# Patient Record
Sex: MALE | Race: WHITE | Employment: OTHER | URBAN - METROPOLITAN AREA
[De-identification: names, ages, dates, MRNs, and addresses within clinical notes are randomized per-mention and may not be internally consistent; named-entity substitution may affect disease eponyms.]

---

## 2017-01-01 ENCOUNTER — TELEPHONE (OUTPATIENT)
Dept: INTERNAL MEDICINE CLINIC | Age: 82
End: 2017-01-01

## 2017-01-01 ENCOUNTER — DOCUMENTATION ONLY (OUTPATIENT)
Dept: INTERNAL MEDICINE CLINIC | Age: 82
End: 2017-01-01

## 2017-01-01 ENCOUNTER — OFFICE VISIT (OUTPATIENT)
Dept: INTERNAL MEDICINE CLINIC | Age: 82
End: 2017-01-01

## 2017-01-01 VITALS
OXYGEN SATURATION: 96 % | HEIGHT: 73 IN | WEIGHT: 173 LBS | BODY MASS INDEX: 22.93 KG/M2 | TEMPERATURE: 98.4 F | HEART RATE: 71 BPM | SYSTOLIC BLOOD PRESSURE: 143 MMHG | DIASTOLIC BLOOD PRESSURE: 71 MMHG | RESPIRATION RATE: 18 BRPM

## 2017-01-01 VITALS
SYSTOLIC BLOOD PRESSURE: 125 MMHG | DIASTOLIC BLOOD PRESSURE: 71 MMHG | TEMPERATURE: 97.5 F | OXYGEN SATURATION: 98 % | HEIGHT: 73 IN | HEART RATE: 57 BPM | BODY MASS INDEX: 23.86 KG/M2 | RESPIRATION RATE: 16 BRPM | WEIGHT: 180 LBS

## 2017-01-01 DIAGNOSIS — M70.21 OLECRANON BURSITIS OF RIGHT ELBOW: ICD-10-CM

## 2017-01-01 DIAGNOSIS — R29.6 RECURRENT FALLS: ICD-10-CM

## 2017-01-01 DIAGNOSIS — E78.2 MIXED HYPERLIPIDEMIA: ICD-10-CM

## 2017-01-01 DIAGNOSIS — E11.9 TYPE 2 DIABETES MELLITUS WITHOUT COMPLICATION, WITH LONG-TERM CURRENT USE OF INSULIN (HCC): ICD-10-CM

## 2017-01-01 DIAGNOSIS — Z79.4 TYPE 2 DIABETES MELLITUS WITHOUT COMPLICATION, WITH LONG-TERM CURRENT USE OF INSULIN (HCC): ICD-10-CM

## 2017-01-01 DIAGNOSIS — I10 ESSENTIAL HYPERTENSION: ICD-10-CM

## 2017-01-01 DIAGNOSIS — R41.89 COGNITIVE DECLINE: ICD-10-CM

## 2017-01-01 DIAGNOSIS — R53.81 DECLINING FUNCTIONAL STATUS: Primary | ICD-10-CM

## 2017-01-01 DIAGNOSIS — Z00.00 MEDICARE ANNUAL WELLNESS VISIT, SUBSEQUENT: Primary | ICD-10-CM

## 2017-01-01 DIAGNOSIS — I25.10 CORONARY ARTERY DISEASE INVOLVING NATIVE CORONARY ARTERY OF NATIVE HEART WITHOUT ANGINA PECTORIS: ICD-10-CM

## 2017-01-01 DIAGNOSIS — Z71.89 ADVANCE CARE PLANNING: ICD-10-CM

## 2017-01-01 DIAGNOSIS — Z86.73 HISTORY OF STROKE: ICD-10-CM

## 2017-01-01 DIAGNOSIS — G20 PARKINSON DISEASE (HCC): ICD-10-CM

## 2017-01-01 DIAGNOSIS — J38.00: ICD-10-CM

## 2017-01-01 DIAGNOSIS — G30.0 EARLY ONSET ALZHEIMER'S DEMENTIA WITHOUT BEHAVIORAL DISTURBANCE (HCC): ICD-10-CM

## 2017-01-01 DIAGNOSIS — Z23 ENCOUNTER FOR IMMUNIZATION: ICD-10-CM

## 2017-01-01 DIAGNOSIS — F02.80 EARLY ONSET ALZHEIMER'S DEMENTIA WITHOUT BEHAVIORAL DISTURBANCE (HCC): ICD-10-CM

## 2017-01-01 DIAGNOSIS — J38.00 VOCAL CORD WEAKNESS: ICD-10-CM

## 2017-01-01 DIAGNOSIS — G20 PARKINSON DISEASE (HCC): Primary | ICD-10-CM

## 2017-01-01 LAB
ALBUMIN SERPL-MCNC: 4 G/DL (ref 3.5–4.7)
ALBUMIN/CREAT UR: 15.6 MG/G CREAT (ref 0–30)
ALBUMIN/GLOB SERPL: 1.6 {RATIO} (ref 1.1–2.5)
ALP SERPL-CCNC: 66 IU/L (ref 39–117)
ALT SERPL-CCNC: 9 IU/L (ref 0–44)
AST SERPL-CCNC: 22 IU/L (ref 0–40)
BASOPHILS # BLD AUTO: 0 X10E3/UL (ref 0–0.2)
BASOPHILS NFR BLD AUTO: 0 %
BILIRUB SERPL-MCNC: 0.3 MG/DL (ref 0–1.2)
BUN SERPL-MCNC: 25 MG/DL (ref 8–27)
BUN/CREAT SERPL: 21 (ref 10–22)
CALCIUM SERPL-MCNC: 9.2 MG/DL (ref 8.6–10.2)
CHLORIDE SERPL-SCNC: 104 MMOL/L (ref 96–106)
CHOLEST SERPL-MCNC: 131 MG/DL (ref 100–199)
CO2 SERPL-SCNC: 27 MMOL/L (ref 18–29)
CREAT SERPL-MCNC: 1.18 MG/DL (ref 0.76–1.27)
CREAT UR-MCNC: 64.7 MG/DL
EOSINOPHIL # BLD AUTO: 0.2 X10E3/UL (ref 0–0.4)
EOSINOPHIL NFR BLD AUTO: 2 %
ERYTHROCYTE [DISTWIDTH] IN BLOOD BY AUTOMATED COUNT: 12.9 % (ref 12.3–15.4)
EST. AVERAGE GLUCOSE BLD GHB EST-MCNC: 117 MG/DL
GLOBULIN SER CALC-MCNC: 2.5 G/DL (ref 1.5–4.5)
GLUCOSE SERPL-MCNC: 89 MG/DL (ref 65–99)
HBA1C MFR BLD: 5.7 % (ref 4.8–5.6)
HCT VFR BLD AUTO: 37.8 % (ref 37.5–51)
HDLC SERPL-MCNC: 46 MG/DL
HGB BLD-MCNC: 12.8 G/DL (ref 12.6–17.7)
IMM GRANULOCYTES # BLD: 0 X10E3/UL (ref 0–0.1)
IMM GRANULOCYTES NFR BLD: 0 %
INTERPRETATION, 910389: NORMAL
INTERPRETATION: NORMAL
LDLC SERPL CALC-MCNC: 65 MG/DL (ref 0–99)
LYMPHOCYTES # BLD AUTO: 1.1 X10E3/UL (ref 0.7–3.1)
LYMPHOCYTES NFR BLD AUTO: 11 %
Lab: NORMAL
MCH RBC QN AUTO: 31.8 PG (ref 26.6–33)
MCHC RBC AUTO-ENTMCNC: 33.9 G/DL (ref 31.5–35.7)
MCV RBC AUTO: 94 FL (ref 79–97)
MICROALBUMIN UR-MCNC: 10.1 UG/ML
MONOCYTES # BLD AUTO: 0.8 X10E3/UL (ref 0.1–0.9)
MONOCYTES NFR BLD AUTO: 8 %
NEUTROPHILS # BLD AUTO: 8.3 X10E3/UL (ref 1.4–7)
NEUTROPHILS NFR BLD AUTO: 79 %
PDF IMAGE, 910387: NORMAL
PLATELET # BLD AUTO: 269 X10E3/UL (ref 150–379)
POTASSIUM SERPL-SCNC: 5.4 MMOL/L (ref 3.5–5.2)
PROT SERPL-MCNC: 6.5 G/DL (ref 6–8.5)
RBC # BLD AUTO: 4.03 X10E6/UL (ref 4.14–5.8)
SODIUM SERPL-SCNC: 147 MMOL/L (ref 134–144)
T4 FREE SERPL-MCNC: 0.98 NG/DL (ref 0.82–1.77)
TRIGL SERPL-MCNC: 99 MG/DL (ref 0–149)
TSH SERPL DL<=0.005 MIU/L-ACNC: 1.11 UIU/ML (ref 0.45–4.5)
VLDLC SERPL CALC-MCNC: 20 MG/DL (ref 5–40)
WBC # BLD AUTO: 10.4 X10E3/UL (ref 3.4–10.8)

## 2017-01-01 RX ORDER — POLYETHYLENE GLYCOL 3350 17 G/17G
17 POWDER, FOR SOLUTION ORAL DAILY
COMMUNITY

## 2017-01-01 RX ORDER — LOSARTAN POTASSIUM 100 MG/1
100 TABLET ORAL DAILY
Qty: 30 TAB | Refills: 5 | Status: SHIPPED | OUTPATIENT
Start: 2017-01-01

## 2017-01-01 RX ORDER — DOCUSATE SODIUM 100 MG/1
CAPSULE, LIQUID FILLED ORAL
Qty: 60 CAP | Refills: 5 | Status: SHIPPED | OUTPATIENT
Start: 2017-01-01

## 2017-01-01 RX ORDER — CEPHALEXIN 250 MG/1
250 CAPSULE ORAL 3 TIMES DAILY
Qty: 21 CAP | Refills: 0 | Status: SHIPPED | OUTPATIENT
Start: 2017-01-01 | End: 2017-01-01

## 2017-01-01 RX ORDER — METHYLPREDNISOLONE 4 MG/1
TABLET ORAL
Qty: 1 DOSE PACK | Refills: 0 | Status: SHIPPED | OUTPATIENT
Start: 2017-01-01 | End: 2017-01-01 | Stop reason: ALTCHOICE

## 2017-01-01 RX ORDER — ASPIRIN 325 MG
325 TABLET ORAL DAILY
Qty: 90 TAB | Refills: 3 | Status: SHIPPED | OUTPATIENT
Start: 2017-01-01

## 2017-01-01 RX ORDER — FUROSEMIDE 20 MG/1
20 TABLET ORAL DAILY
Qty: 30 TAB | Refills: 5 | Status: SHIPPED | OUTPATIENT
Start: 2017-01-01

## 2017-01-01 RX ORDER — METOPROLOL SUCCINATE 25 MG/1
25 TABLET, EXTENDED RELEASE ORAL DAILY
Qty: 30 TAB | Refills: 5 | Status: SHIPPED | OUTPATIENT
Start: 2017-01-01

## 2017-01-01 RX ORDER — CARBIDOPA AND LEVODOPA 25; 100 MG/1; MG/1
2 TABLET ORAL 3 TIMES DAILY
Qty: 180 TAB | Refills: 5 | Status: SHIPPED | OUTPATIENT
Start: 2017-01-01

## 2017-01-01 RX ORDER — PRAVASTATIN SODIUM 40 MG/1
40 TABLET ORAL DAILY
Qty: 30 TAB | Refills: 5 | Status: SHIPPED | OUTPATIENT
Start: 2017-01-01

## 2017-01-19 NOTE — TELEPHONE ENCOUNTER
Pt's niece Alejandro Duncan called to speak with nurse to get a script for Hospice for Mr. Heidi Conner. Spoke with Jose Walters NN she will also speak with dr Darryle Colt to get the Script for pt.

## 2017-01-19 NOTE — TELEPHONE ENCOUNTER
Jessica Sims advised that patient must be seen by physician to determine if referral for hospice consult is appropriate.

## 2017-02-10 NOTE — PROGRESS NOTES
Reviewed record  In preparation for visit and have obtained necessary documentation. 1. Have you been to the ER, urgent care clinic since your last visit? Hospitalized since your last visit?no  2. Have you seen or consulted any other health care providers outside of the [address] [place] [address] since your last visit? Include any pap smears or colon screening. Sees Dr Hazel Vicente for his eyes  Josh,patients nephew is POA. Pneumococcal 13-valent (Prevnar 13)Conjugate vaccine 0.5 ml given left deltoid IM. [employer] exp. 2/18 Lot [id number] VIS given. Patient tolerated procedure without incident.

## 2017-02-10 NOTE — PROGRESS NOTES
CC:  Chief Complaint   Patient presents with    Elbow Injury     right elbow injury from fall    Fall     multiple falls   [employer] Visit    Immunization/Injection       HISTORY OF PRESENT ILLNESS  Marko Crabtree is a 80 y.o. male          ROS  Constitutional: negative for fevers, chills, night sweats  ENT:   negative for sore throat, nasal congestion, ear pains, hoarseness  Respiratory:  negative for cough, hemoptysis, dyspnea,wheezing  CV:   negative for chest pain, palpitations, lower extremity edema  GI:   negative for heartburn, abd pain, nausea, vomiting, diarrhea, constipation  Genitourinary: negative for frequency, dysuria and hematuria  Integument:  negative for rash and pruritus  Musculoskel: negative for myalgias, arthralgias, back pain, muscle weakness, joint pain  Neurological:  negative for headaches, dizziness, vertigo, gait problems  Behavl/Psych: negative for feelings of anxiety, depression, mood change     Patient Active Problem List   Diagnosis Code    Parkinson disease ([facility] G20    Essential hypertension I10    History of stroke Z80.78    CAD (coronary artery disease) I25.10    Advance care planning Z71.89    Hyperlipidemia E78.5    Sick sinus syndrome ([facility] I49.5    Type II diabetes mellitus ([facility] E11.9     Past Medical History   Diagnosis Date    CAD (coronary artery disease)      s/p stent; follows with Dr. Alpa Graves Diabetes mellitus [facility])      type 2    Essential hypertension     Hyperlipidemia     [facility] ([facility]     S[facility] syndrome ([facility]      follows with Dr. Alpa Graves TIA (transient ischemic attack)      No Known Allergies  Current Outpatient Prescriptions   Medication Sig Dispense Refill    cranberry extract 450 mg tab Take  by mouth two (2) times a day.  PSYLLIUM SEED (METAFIBER PO) Take  by mouth two (2) times a day.  furosemide (LASIX) 20 mg tablet Take 1 Tab by mouth daily. For foot and ankle swelling.  30 Tab 3    docusate sodium (COLACE) 100 mg capsule Take 1 capsule by mouth twice a day. Indications: CONSTIPATION 60 Cap 5    carbidopa-levodopa (SINEMET)  mg per tablet Take 2 Tabs by mouth three (3) times daily.  losartan (COZAAR) 100 mg tablet Take 100 mg by mouth daily.  metoprolol succinate (TOPROL-XL) 25 mg XL tablet Take 25 mg by mouth.  pravastatin (PRAVACHOL) 40 mg tablet Take 40 mg by mouth daily.  aspirin (ASPIRIN) 325 mg tablet Take 325 mg by mouth daily.  tamsulosin (FLOMAX) 0.4 mg capsule Take 1 Cap by mouth daily. For increased urination. Take with finasteride. 30 Cap 3    finasteride (PROSCAR) 5 mg tablet Take 5 mg by mouth daily. PHYSICAL EXAM  Visit Vitals    /71    Pulse (!) 57    Temp 97.5 °F (36.4 °C) (Oral)    Resp 16    Ht 6' 1\" (1.854 m)    Wt 180 lb (81.6 kg)    SpO2 98%    BMI 23.75 kg/m2       General: Well-developed and well-nourished, no distress. HEENT:  Head normocephalic/atraumatic, no scleral icterus  Lungs:  Clear to ausculation bilaterally. Good air movement. Heart:  Regular rate and rhythm, normal S1 and S2, no murmur, gallop, or rub  Extremities: No clubbing, cyanosis, or edema. Neurological: Alert and oriented. Psychiatric: Normal mood and affect.  Behavior is normal.   Diabetic foot exam:     Left: Reflexes {Reflexes:19363}     Vibratory sensation {vibration:83580}    Proprioception {propriocept:86651}   Sharp/dull discrimination {sharp dull:34218}    Filament test {filament test:36829}   Pulse DP: { :88831}   Pulse PT: { :92435}   Deformities: {None/mild/yes:21466}  Right: Reflexes {Reflexes:11696}   Vibratory sensation {vibration:05076}   Proprioception {propriocept:42507}   Sharp/dull discrimination {sharp dull:69008}   Filament test {filament test:70501}   Pulse DP: { :59664}   Pulse PT: { :08529}   Deformities: {None/mild/yes:06250}      Results for orders placed or performed in visit on 04/27/16   AMB EXT URINE MICROALBUMIN Result Value Ref Range    Urine Microalbumin, External 0.9          ASSESSMENT AND PLAN  {ASSESSMENT/PLAN:67319}    Provided patient and/or family with advanced directive information and answered pertinent questions. Encouraged patient to provide a copy of advanced directive to the office when available. I have discussed the diagnosis with the patient and the intended plan as seen in the above orders. Patient is in agreement. The patient has received an after-visit summary and questions were answered concerning future plans. I have discussed medication side effects and warnings with the patient as well.

## 2017-02-10 NOTE — MR AVS SNAPSHOT
Visit Information Date & Time Provider Department Dept. Phone Encounter #  
 2/10/2017  1:30 PM Ayden Pichardo, 40 [facility] [phone] [id number] Follow-up Instructions Return in about 3 months (around 5/10/2017), or if symptoms worsen or fail to improve, for 3 month follow up; needs 40-60 mins. [facility] Upcoming Health Maintenance Date Due  
 EYE EXAM RETINAL OR DILATED Q1 7/27/1945 GLAUCOMA SCREENING Q2Y 7/27/2000 MEDICARE YEARLY EXAM 5/18/2016 HEMOGLOBIN A1C Q6M 6/23/2016 Pneumococcal 65+ Low/Medium Risk (2 of 2 - PCV13) 12/23/2016 FOOT EXAM Q1 12/23/2016 MICROALBUMIN Q1 12/23/2016 LIPID PANEL Q1 12/23/2016 DTaP/Tdap/Td series (2 - Td) 12/23/2025 Allergies as of 2/10/2017  Review Complete On: 2/10/2017 By: Ayden Pichardo MD  
 No Known Allergies Current Immunizations  Reviewed on 12/23/2015 Name Date Pneumococcal Conjugate (PCV-13)  Incomplete Pneumococcal Polysaccharide (PPSV-23) 12/23/2015 Tdap 12/23/2015 Not reviewed this visit You Were Diagnosed With   
  
 Codes Comments Medicare annual wellness visit, subsequent    -  Primary ICD-10-CM: Z00.00 ICD-9-CM: V70.0 Recurrent falls     ICD-10-CM: R29.6 ICD-9-CM: V15.88 Early onset Alzheimer's dementia without behavioral disturbance     ICD-10-CM: G30.0, F02.80 ICD-9-CM: 331.0, 294.10 Parkinson disease ([facility] [address]     ICD-10-CM: G20 
ICD-9-CM: 332.0 Type 2 diabetes mellitus without complication, with long-term current use of insulin (HCC)     ICD-10-CM: E11.9, Z79.4 ICD-9-CM: 250.00, V58.67 Essential hypertension     ICD-10-CM: I10 
ICD-9-CM: 401.9 Coronary artery disease involving native coronary artery of native heart without angina pectoris     ICD-10-CM: I25.10 ICD-9-CM: 414.01 Mixed hyperlipidemia     ICD-10-CM: E78.2 ICD-9-CM: 272.2 Advance care planning     ICD-10-CM: Z71.89 ICD-9-CM: V65.49 History of stroke     ICD-10-CM: Z86.73 
ICD-9-CM: V12.54 Encounter for immunization     ICD-10-CM: K42 ICD-9-CM: V03.89 Olecranon bursitis of right elbow     ICD-10-CM: M70.21 ICD-9-CM: 726.33 Vitals BP Pulse Temp Resp Height(growth percentile) Weight(growth percentile) 125/71 (!) 57 97.5 °F (36.4 °C) (Oral) 16 6' 1\" (1.854 m) 180 lb (81.6 kg) SpO2 BMI Smoking Status 98% 23.75 kg/m2 Never Smoker Vitals History BMI and BSA Data Body Mass Index Body Surface Area  
 23.75 kg/m 2 2.05 m 2 Preferred Pharmacy Pharmacy Name [facility] [address] [place], [place] - [address] [place] [address] Lina Lisbet [phone] Your Updated Medication List  
  
   
This list is accurate as of: 2/10/17  2:44 PM.  Always use your most recent med list.  
  
  
  
  
 aspirin 325 mg tablet Commonly known as:  ASPIRIN Take 1 Tab by mouth daily. carbidopa-levodopa  mg per tablet Commonly known as:  SINEMET Take 2 Tabs by mouth three (3) times daily. Indications: IDIOPATHIC PARKINSONISM  
  
 cephALEXin 250 mg capsule Commonly known as:  Serenity Moi Take 1 Cap by mouth three (3) times daily for 7 days. cranberry extract 450 mg Tab Take  by mouth two (2) times a day. docusate sodium 100 mg capsule Commonly known as:  Raffi Pata Take 1 capsule by mouth twice a day. Indications: Constipation  
  
 finasteride 5 mg tablet Commonly known as:  PROSCAR Take 5 mg by mouth daily. furosemide 20 mg tablet Commonly known as:  LASIX Take 1 Tab by mouth daily. For foot and ankle swelling. losartan 100 mg tablet Commonly known as:  COZAAR Take 1 Tab by mouth daily. METAFIBER PO Take  by mouth two (2) times a day. methylPREDNISolone 4 mg tablet Commonly known as:  Blease Getting Use following package instructions. metoprolol succinate 25 mg XL tablet Commonly known as:  TOPROL-XL Take 1 Tab by mouth daily. pravastatin 40 mg tablet Commonly known as:  PRAVACHOL Take 1 Tab by mouth daily. tamsulosin 0.4 mg capsule Commonly known as:  FLOMAX Take 1 Cap by mouth daily. For increased urination. Take with finasteride. Prescriptions Sent to Pharmacy Refills  
 furosemide (LASIX) 20 mg tablet 5 Sig: Take 1 Tab by mouth daily. For foot and ankle swelling. Class: Normal  
 Pharmacy: [address] [place] [address] [address] [place] [address] Ph #: [phone] Route: Oral  
 docusate sodium (COLACE) 100 mg capsule 5 Sig: Take 1 capsule by mouth twice a day. Indications: Constipation Class: Normal  
 Pharmacy: [address] [place] [address] Ph #: [phone]  
 carbidopa-levodopa (SINEMET)  mg per tablet 5 Sig: Take 2 Tabs by mouth three (3) times daily. Indications: IDIOPATHIC PARKINSONISM Class: Normal  
 Pharmacy: [address] [place] [address]. [address] [place] [address] Ph #: [phone] Route: Oral  
 losartan (COZAAR) 100 mg tablet 5 Sig: Take 1 Tab by mouth daily. Class: Normal  
 Pharmacy: [facility] [address] [place] [address] Ph #: [phone] Route: Oral  
 metoprolol succinate (TOPROL-XL) 25 mg XL tablet 5 Sig: Take 1 Tab by mouth daily. Class: Normal  
 Pharmacy: [facility] [address] [place] [address] Ph #: [phone] Route: Oral  
 pravastatin (PRAVACHOL) 40 mg tablet 5 Sig: Take 1 Tab by mouth daily. Class: Normal  
 Pharmacy: [facility] [address] [place] [address] Ph #: [phone] Route: Oral  
 aspirin (ASPIRIN) 325 mg tablet 3 Sig: Take 1 Tab by mouth daily. Class: Normal  
 Pharmacy: [facility] [address] [place] [address] Ph #: [phone] Route: Oral  
 methylPREDNISolone (MEDROL DOSEPACK) 4 mg tablet 0 Sig: Use following package instructions.   
 Class: Normal  
 Pharmacy: [address] [facility] Ph #: [phone]  
 cephALEXin (KEFLEX) 250 mg capsule 0 Sig: Take 1 Cap by mouth three (3) times daily for 7 days. Class: Normal  
 Pharmacy: [address] [facility], [address] [place] [address] Ph #: [phone] Route: Oral  
  
We Performed the Following ADMIN INFLUENZA VIRUS VAC [ [facility]] AMB SUPPLY ORDER [[id number] Custom] Comments:  
 Hospital bed with rasadiq Diagnosis: Recurrent falls (R29.6), Parkinson's disease (Sánchez Lisestephanie) Duration: Lifelong CBC WITH AUTOMATED DIFF [95250 CPT(R)] HEMOGLOBIN A1C WITH EAG [90368 CPT(R)] LIPID PANEL [72300 CPT(R)] METABOLIC PANEL, COMPREHENSIVE [44477 CPT(R)] MICROALBUMIN, UR, RAND W/ MICROALBUMIN/CREA RATIO [id number] CPT(R)] PNEUMOCOCCAL CONJ VACCINE 13 VALENT IM [id number] CPT(R)] REFERRAL TO OPHTHALMOLOGY [REF57 Custom] Comments:  
 Please evaluate patient for dilated/retinal exam/glaucoma screening. TSH AND FREE T4 [36637 CPT(R)] Follow-up Instructions Return in about 3 months (around 5/10/2017), or if symptoms worsen or fail to improve, for 3 month follow up; needs 40-60 mins. Chris Ibarra Referral Information Referral ID Referred By Referred To  
  
 [id number] Phone: [phone] Fax: [phone] Visits Status Start Date End Date 1 New Request 2/10/17 2/10/18 If your referral has a status of pending review or denied, additional information will be sent to support the outcome of this decision. Patient Instructions Schedule of Personalized Health Plan (Provide Copy to Patient) The best way to stay healthy is to live a healthy lifestyle. A healthy lifestyle includes regular exercise, eating a well-balanced diet, keeping a healthy weight and not smoking. Regular physical exams and screening tests are another important way to take care of yourself. Preventive exams provided by health care providers can find health problems early when treatment works best and can keep you from getting certain diseases or illnesses. Preventive services include exams, lab tests, screenings, shots, monitoring and information to help you take care of your own health. All people over 65 should have a pneumonia shot. Pneumonia shots are usually only needed once in a lifetime unless your doctor decides differently. All people over 65 should have a yearly flu shot. People over 65 are at medium to high risk for Hepatitis B. Three shots are needed for complete protection. In addition to your physical exam, some screening tests are recommended: 
 
Bone mass measurement (dexa scan) is recommended every two years if you have certain risk factors, such as personal history of vertebral fracture or chronic steroid medication use Diabetes Mellitus screening is recommended every year. Glaucoma is an eye disease caused by high pressure in the eye. An eye exam is recommended every year. Cardiovascular screening tests that check your cholesterol and other blood fat (lipid) levels are recommended every five years. Colorectal Cancer screening tests help to find pre-cancerous polyps (growths in the colon) so they can be removed before they turn into cancer. Tests ordered for screening depend on your personal and family history risk factors. Screening for Prostate Cancer is recommended yearly with a digital rectal exam and/or a PSA test 
 
Here is a list of your current Health Maintenance items with a due date: 
Health Maintenance Topic Date Due  
 EYE EXAM RETINAL OR DILATED Q1  07/27/1945  GLAUCOMA SCREENING Q2Y  07/27/2000  MEDICARE YEARLY EXAM  05/18/2016  
 HEMOGLOBIN A1C Q6M  06/23/2016  Pneumococcal 65+ Low/Medium Risk (2 of 2 - PCV13) 12/23/2016  FOOT EXAM Q1  12/23/2016  MICROALBUMIN Q1  12/23/2016  LIPID PANEL Q1  12/23/2016  
 DTaP/Tdap/Td series (2 - Td) 12/23/2025  ZOSTER VACCINE AGE 60>  Completed  INFLUENZA AGE 9 TO ADULT  Completed Vaccine Information Statement Pneumococcal Conjugate Vaccine (PCV13): What You Need to Know Many Vaccine Information Statements are available in Wallisian and other languages. See www.immunize.org/vis. Hojas de información Sobre Vacunas están disponibles en español y en muchos otros idiomas. Visite www.immunize.org/vis. 1. Why get vaccinated? Vaccination can protect both children and adults from pneumococcal disease. Pneumococcal disease is caused by bacteria that can spread from person to person through close contact. It can cause ear infections, and it can also lead to more serious infections of the: 
 Lungs (pneumonia),  Blood (bacteremia), and 
 Covering of the brain and spinal cord (meningitis). Pneumococcal pneumonia is most common among adults. Pneumococcal meningitis can cause deafness and brain damage, and it kills about 1 child in 10 who get it. Anyone can get pneumococcal disease, but children under 3years of age and adults 72 years and older, people with certain medical conditions, and cigarette smokers are at the highest risk. Before there was a vaccine, the [facility] saw: 
 more than 700 cases of meningitis, 
 about 13,000 blood infections, 
 about 5 million ear infections, and 
 about 200 deaths 
in children under 5 each year from pneumococcal disease. Since vaccine became available, severe pneumococcal disease in these children has fallen by 88%. About 18,000 older adults die of pneumococcal disease each year in the [place]. Treatment of pneumococcal infections with penicillin and other drugs is not as effective as it used to be, because some strains of the disease have become resistant to these drugs.  This makes prevention of the disease, through vaccination, even more important. 2. PCV13 vaccine Pneumococcal conjugate vaccine (called PCV13) protects against 13 types of pneumococcal bacteria. PCV13 is routinely given to children at 2, 4, 6, and 1515 months of age. It is also recommended for children and adults 3to 59years of age with certain health conditions, and for all adults 72years of age and older. Your doctor can give you details. 3. Some people should not get this vaccine Anyone who has ever had a life-threatening allergic reaction to a dose of this vaccine, to an earlier pneumococcal vaccine called PCV7, or to any vaccine containing diphtheria toxoid (for example, DTaP), should not get PCV13. Anyone with a severe allergy to any component of PCV13 should not get the vaccine. Tell your doctor if the person being vaccinated has any severe allergies. If the person scheduled for vaccination is not feeling well, your healthcare provider might decide to reschedule the shot on another day. 4. Risks of a vaccine reaction With any medicine, including vaccines, there is a chance of reactions. These are usually mild and go away on their own, but serious reactions are also possible. Problems reported following PCV13 varied by age and dose in the series. The most common problems reported among children were:  About half became drowsy after the shot, had a temporary loss of appetite, or had redness or tenderness where the shot was given.  About 1 out of 3 had swelling where the shot was given.  About 1 out of 3 had a mild fever, and about 1 in 20 had a fever over 102.2°F. 
 Up to about 8 out of 10 became fussy or irritable. Adults have reported pain, redness, and swelling where the shot was given; also mild fever, fatigue, headache, chills, or muscle pain.  
 
Young children who get PCV13 along with inactivated flu vaccine at the same time may be at increased risk for seizures caused by fever. Ask your doctor for more information. Problems that could happen after any vaccine:  People sometimes faint after a medical procedure, including vaccination. Sitting or lying down for about 15 minutes can help prevent fainting, and injuries caused by a fall. Tell your doctor if you feel dizzy, or have vision changes or ringing in the ears.  Some older children and adults get severe pain in the shoulder and have difficulty moving the arm where a shot was given. This happens very rarely.  Any medication can cause a severe allergic reaction. Such reactions from a vaccine are very rare, estimated at about 1 in a million doses, and would happen within a few minutes to a few hours after the vaccination. As with any medicine, there is a very small chance of a vaccine causing a serious injury or death. The safety of vaccines is always being monitored. For more information, visit: www.cdc.gov/vaccinesafety/  
 
5. What if there is a serious reaction? What should I look for?  Look for anything that concerns you, such as signs of a severe allergic reaction, very high fever, or unusual behavior. Signs of a severe allergic reaction can include hives, swelling of the face and throat, difficulty breathing, a fast heartbeat, dizziness, and weakness  usually within a few minutes to a few hours after the vaccination. What should I do?  If you think it is a severe allergic reaction or other emergency that cant wait, call 9-1-1 or get the person to the nearest hospital. Otherwise, call your doctor. Reactions should be reported to the Vaccine Adverse Event Reporting System (VAERS). Your doctor should file this report, or you can do it yourself through the VAERS web site at www.vaers. hhs.gov, or by calling 9-133.373.4844. VAERS does not give medical advice.  
 
6. The National Vaccine Injury [place] [place] [place] 
 
 The [employer] Injury Compensation Program (VICP) is a federal program that was created to compensate people who may have been injured by certain vaccines. Persons who believe they may have been injured by a vaccine can learn about the program and about filing a claim by calling 5-350.721.1847 or visiting the [employer] website at www.[facility].gov/vaccinecompensation. There is a time limit to file a claim for compensation. 7. How can I learn more?  Ask your healthcare provider. He or she can give you the vaccine package insert or suggest other sources of information.  Call your local or state health department.  Contact the Centers for Disease Control and Prevention (CDC): 
- Call 1-260.163.3292 (6-293-IXM-INFO) or 
- Visit CDCs website at www.cdc.gov/vaccines Vaccine Information Statement PCV13 Vaccine 11/5/2015  
42 LISA Tobias Ip 179RE-83 [facility] [place] Centers for Disease Control and Prevention Office Use Only Preventing Falls: Care Instructions Your Care Instructions Getting around your home safely can be a challenge if you have injuries or health problems that make it easy for you to fall. Loose rugs and furniture in walkways are among the dangers for many older people who have problems walking or who have poor eyesight. People who have conditions such as arthritis, osteoporosis, or dementia also have to be careful not to fall. You can make your home safer with a few simple measures. Follow-up care is a key part of your treatment and safety. Be sure to make and go to all appointments, and call your doctor if you are having problems. It's also a good idea to know your test results and keep a list of the medicines you take. How can you care for yourself at home? Taking care of yourself · You may get dizzy if you do not drink enough water.  To prevent dehydration, drink plenty of fluids, enough so that your urine is light yellow or clear like water. Choose water and other caffeine-free clear liquids. If you have kidney, heart, or liver disease and have to limit fluids, talk with your doctor before you increase the amount of fluids you drink. · Exercise regularly to improve your strength, muscle tone, and balance. Walk if you can. Swimming may be a good choice if you cannot walk easily. · Have your vision and hearing checked each year or any time you notice a change. If you have trouble seeing and hearing, you might not be able to avoid objects and could lose your balance. · Know the side effects of the medicines you take. Ask your doctor or pharmacist whether the medicines you take can affect your balance. Sleeping pills or sedatives can affect your balance. · Limit the amount of alcohol you drink. Alcohol can impair your balance and other senses. · Ask your doctor whether calluses or corns on your feet need to be removed. If you wear loose-fitting shoes because of calluses or corns, you can lose your balance and fall. · Talk to your doctor if you have numbness in your feet. Preventing falls at home · Remove raised doorway thresholds, throw rugs, and clutter. Repair loose carpet or raised areas in the floor. · Move furniture and electrical cords to keep them out of walking paths. · Use nonskid floor wax, and wipe up spills right away, especially on ceramic tile floors. · If you use a walker or cane, put rubber tips on it. If you use crutches, clean the bottoms of them regularly with an abrasive pad, such as steel wool. · Keep your house well lit, especially Trygve Ou, and outside walkways. Use night-lights in areas such as hallways and bathrooms. Add extra light switches or use remote switches (such as switches that go on or off when you clap your hands) to make it easier to turn lights on if you have to get up during the night. · Install sturdy handrails on stairways. · Move items in your cabinets so that the things you use a lot are on the lower shelves (about waist level). · Keep a cordless phone and a flashlight with new batteries by your bed. If possible, put a phone in each of the main rooms of your house, or carry a cell phone in case you fall and cannot reach a phone. Or, you can wear a device around your neck or wrist. You push a button that sends a signal for help. · Wear low-heeled shoes that fit well and give your feet good support. Use footwear with nonskid soles. Check the heels and soles of your shoes for wear. Repair or replace worn heels or soles. · Do not wear socks without shoes on wood floors. · Walk on the grass when the sidewalks are slippery. If you live in an area that gets snow and ice in the winter, sprinkle salt on slippery steps and sidewalks. Preventing falls in the bath · Install grab bars and nonskid mats inside and outside your shower or tub and near the toilet and sinks. · Use shower chairs and bath benches. · Use a hand-held shower head that will allow you to sit while showering. · Get into a tub or shower by putting the weaker leg in first. Get out of a tub or shower with your strong side first. 
· Repair loose toilet seats and consider installing a raised toilet seat to make getting on and off the toilet easier. · Keep your bathroom door unlocked while you are in the shower. Where can you learn more? Go to http://carlos-abimbola.info/. Enter [id number] in the search box to learn more about \"Preventing Falls: Care Instructions. \" Current as of: August 4, 2016 Content Version: 11.1 © 3006-7715 [employer]. Care instructions adapted under license by [employer] (which disclaims liability or warranty for this information).  If you have questions about a medical condition or this instruction, always ask your healthcare professional. [employer] disclaims any warranty or liability for your use of this information. Introducing [facility]! Mingo Ram introduces [employer] patient portal. Now you can access parts of your medical record, email your doctor's office, and request medication refills online. 1. In your internet browser, go to [email or web address] 2. Click on the First Time User? Click Here link in the Sign In box. You will see the New Member Sign Up page. 3. Enter your [employer] Access Code exactly as it appears below. You will not need to use this code after youve completed the sign-up process. If you do not sign up before the expiration date, you must request a new code. · [employer] Access Code: [email or web address] Expires: 5/11/2017  1:58 PM 
 
4. Enter the last four digits of your Social Security Number (xxxx) and Date of Birth (mm/dd/yyyy) as indicated and click Submit. You will be taken to the next sign-up page. 5. Create a StrongSteam ID. This will be your [employer]am login ID and cannot be changed, so think of one that is secure and easy to remember. 6. Create a [employer] password. You can change your password at any time. 7. Enter your Password Reset Question and Answer. This can be used at a later time if you forget your password. 8. Enter your e-mail address. You will receive e-mail notification when new information is available in [address] 9. Click Sign Up. You can now view and download portions of your medical record. 10. Click the Download Summary menu link to download a portable copy of your medical information. If you have questions, please visit the Frequently Asked Questions section of the [employer] website. Remember, [employer] is NOT to be used for urgent needs. For medical emergencies, dial 911. Now available from your iPhone and Android! Please provide this summary of care documentation to your next provider. Your primary care clinician is listed as Alvaro Blanca. If you have any questions after today's visit, please call [phone].

## 2017-02-10 NOTE — PATIENT INSTRUCTIONS
Schedule of Personalized Health Plan  (Provide Copy to Patient)  The best way to stay healthy is to live a healthy lifestyle. A healthy lifestyle includes regular exercise, eating a well-balanced diet, keeping a healthy weight and not smoking. Regular physical exams and screening tests are another important way to take care of yourself. Preventive exams provided by health care providers can find health problems early when treatment works best and can keep you from getting certain diseases or illnesses. Preventive services include exams, lab tests, screenings, shots, monitoring and information to help you take care of your own health. All people over 65 should have a pneumonia shot. Pneumonia shots are usually only needed once in a lifetime unless your doctor decides differently. All people over 65 should have a yearly flu shot. People over 65 are at medium to high risk for Hepatitis B. Three shots are needed for complete protection. In addition to your physical exam, some screening tests are recommended:    Bone mass measurement (dexa scan) is recommended every two years if you have certain risk factors, such as personal history of vertebral fracture or chronic steroid medication use    Diabetes Mellitus screening is recommended every year. Glaucoma is an eye disease caused by high pressure in the eye. An eye exam is recommended every year. Cardiovascular screening tests that check your cholesterol and other blood fat (lipid) levels are recommended every five years. Colorectal Cancer screening tests help to find pre-cancerous polyps (growths in the colon) so they can be removed before they turn into cancer. Tests ordered for screening depend on your personal and family history risk factors.     Screening for Prostate Cancer is recommended yearly with a digital rectal exam and/or a PSA test    Here is a list of your current Health Maintenance items with a due date:  Health Maintenance Topic Date Due    EYE EXAM RETINAL OR DILATED Q1  07/27/1945    GLAUCOMA SCREENING Q2Y  07/27/2000    MEDICARE YEARLY EXAM  05/18/2016    HEMOGLOBIN A1C Q6M  06/23/2016    Pneumococcal 65+ Low/Medium Risk (2 of 2 - PCV13) 12/23/2016    FOOT EXAM Q1  12/23/2016    MICROALBUMIN Q1  12/23/2016    LIPID PANEL Q1  12/23/2016    DTaP/Tdap/Td series (2 - Td) 12/23/2025    ZOSTER VACCINE AGE 60>  Completed    INFLUENZA AGE 9 TO ADULT  Completed             Vaccine Information Statement     Pneumococcal Conjugate Vaccine (PCV13): What You Need to Know    Many Vaccine Information Statements are available in French and other languages. See www.immunize.org/vis. Hojas de información Sobre Vacunas están disponibles en español y en muchos otros idiomas. Visite www.immunize.org/vis. 1. Why get vaccinated? Vaccination can protect both children and adults from pneumococcal disease. Pneumococcal disease is caused by bacteria that can spread from person to person through close contact. It can cause ear infections, and it can also lead to more serious infections of the:   Lungs (pneumonia),   Blood (bacteremia), and   Covering of the brain and spinal cord (meningitis). Pneumococcal pneumonia is most common among adults. Pneumococcal meningitis can cause deafness and brain damage, and it kills about 1 child in 10 who get it. Anyone can get pneumococcal disease, but children under 3years of age and adults 72 years and older, people with certain medical conditions, and cigarette smokers are at the highest risk. Before there was a vaccine, the [facility] saw:   more than 700 cases of meningitis,   about 13,000 blood infections,   about 5 million ear infections, and   about 200 deaths  in children under 5 each year from pneumococcal disease. Since vaccine became available, severe pneumococcal disease in these children has fallen by 88%.     About 18,000 older adults die of pneumococcal disease each year in the [place]. Treatment of pneumococcal infections with penicillin and other drugs is not as effective as it used to be, because some strains of the disease have become resistant to these drugs. This makes prevention of the disease, through vaccination, even more important. 2. PCV13 vaccine    Pneumococcal conjugate vaccine (called PCV13) protects against 13 types of pneumococcal bacteria. PCV13 is routinely given to children at 2, 4, 6, and 1515 months of age. It is also recommended for children and adults 3to 59years of age with certain health conditions, and for all adults 72years of age and older. Your doctor can give you details. 3. Some people should not get this vaccine    Anyone who has ever had a life-threatening allergic reaction to a dose of this vaccine, to an earlier pneumococcal vaccine called PCV7, or to any vaccine containing diphtheria toxoid (for example, DTaP), should not get PCV13. Anyone with a severe allergy to any component of PCV13 should not get the vaccine. Tell your doctor if the person being vaccinated has any severe allergies. If the person scheduled for vaccination is not feeling well, your healthcare provider might decide to reschedule the shot on another day. 4. Risks of a vaccine reaction    With any medicine, including vaccines, there is a chance of reactions. These are usually mild and go away on their own, but serious reactions are also possible. Problems reported following PCV13 varied by age and dose in the series. The most common problems reported among children were:    About half became drowsy after the shot, had a temporary loss of appetite, or had redness or tenderness where the shot was given.  About 1 out of 3 had swelling where the shot was given.  About 1 out of 3 had a mild fever, and about 1 in 20 had a fever over 102.2°F.   Up to about 8 out of 10 became fussy or irritable.      Adults have reported pain, redness, and swelling where the shot was given; also mild fever, fatigue, headache, chills, or muscle pain. Dulcebekah Heck children who get PCV13 along with inactivated flu vaccine at the same time may be at increased risk for seizures caused by fever. Ask your doctor for more information. Problems that could happen after any vaccine:     People sometimes faint after a medical procedure, including vaccination. Sitting or lying down for about 15 minutes can help prevent fainting, and injuries caused by a fall. Tell your doctor if you feel dizzy, or have vision changes or ringing in the ears.  Some older children and adults get severe pain in the shoulder and have difficulty moving the arm where a shot was given. This happens very rarely.  Any medication can cause a severe allergic reaction. Such reactions from a vaccine are very rare, estimated at about 1 in a million doses, and would happen within a few minutes to a few hours after the vaccination. As with any medicine, there is a very small chance of a vaccine causing a serious injury or death. The safety of vaccines is always being monitored. For more information, visit: www.cdc.gov/vaccinesafety/     5. What if there is a serious reaction? What should I look for?  Look for anything that concerns you, such as signs of a severe allergic reaction, very high fever, or unusual behavior. Signs of a severe allergic reaction can include hives, swelling of the face and throat, difficulty breathing, a fast heartbeat, dizziness, and weakness  usually within a few minutes to a few hours after the vaccination. What should I do?  If you think it is a severe allergic reaction or other emergency that cant wait, call 9-1-1 or get the person to the nearest hospital. Otherwise, call your doctor. Reactions should be reported to the Vaccine Adverse Event Reporting System (VAERS).  Your doctor should file this report, or you can do it yourself through the VAERS web site at www.vaers. [facility].gov, or by calling 4-666.793.7856. VAERS does not give medical advice. 6. The National Vaccine Injury Compensation Program    The [facility] Vaccine Injury Compensation Program (VICP) is a federal program that was created to compensate people who may have been injured by certain vaccines. Persons who believe they may have been injured by a vaccine can learn about the program and about filing a claim by calling 8-328.110.7814 or visiting the [facility] [address] website at www.[email or web address].gov/vaccinecompensation. There is a time limit to file a claim for compensation. 7. How can I learn more?  Ask your healthcare provider. He or she can give you the vaccine package insert or suggest other sources of information.  Call your local or state health department.  Contact the Centers for Disease Control and Prevention (CDC):  - Call 8-388.601.8521 (1-800-CDC-INFO) or  - Visit CDCs website at www.cdc.gov/vaccines    Vaccine Information Statement   PCV13 Vaccine   11/5/2015   42 [address] [id number]    Department of Health and Human Services  Centers for Disease Control and Prevention    Office Use Only       Preventing Falls: Care Instructions  Your Care Instructions  Getting around your home safely can be a challenge if you have injuries or health problems that make it easy for you to fall. Loose rugs and furniture in walkways are among the dangers for many older people who have problems walking or who have poor eyesight. People who have conditions such as arthritis, osteoporosis, or dementia also have to be careful not to fall. You can make your home safer with a few simple measures. Follow-up care is a key part of your treatment and safety. Be sure to make and go to all appointments, and call your doctor if you are having problems. It's also a good idea to know your test results and keep a list of the medicines you take. How can you care for yourself at home?   Taking care of yourself  · You may get dizzy if you do not drink enough water. To prevent dehydration, drink plenty of fluids, enough so that your urine is light yellow or clear like water. Choose water and other caffeine-free clear liquids. If you have kidney, heart, or liver disease and have to limit fluids, talk with your doctor before you increase the amount of fluids you drink. · Exercise regularly to improve your strength, muscle tone, and balance. Walk if you can. Swimming may be a good choice if you cannot walk easily. · Have your vision and hearing checked each year or any time you notice a change. If you have trouble seeing and hearing, you might not be able to avoid objects and could lose your balance. · Know the side effects of the medicines you take. Ask your doctor or pharmacist whether the medicines you take can affect your balance. Sleeping pills or sedatives can affect your balance. · Limit the amount of alcohol you drink. Alcohol can impair your balance and other senses. · Ask your doctor whether calluses or corns on your feet need to be removed. If you wear loose-fitting shoes because of calluses or corns, you can lose your balance and fall. · Talk to your doctor if you have numbness in your feet. Preventing falls at home  · Remove raised doorway thresholds, throw rugs, and clutter. Repair loose carpet or raised areas in the floor. · Move furniture and electrical cords to keep them out of walking paths. · Use nonskid floor wax, and wipe up spills right away, especially on ceramic tile floors. · If you use a walker or cane, put rubber tips on it. If you use crutches, clean the bottoms of them regularly with an abrasive pad, such as steel wool. · Keep your house well lit, especially [place] [address], and outside walkways. Use night-lights in areas such as hallways and bathrooms.  Add extra light switches or use remote switches (such as switches that go on or off when you clap your hands) to make it easier to turn lights on if you have to get up during the night. · Install sturdy handrails on stairways. · Move items in your cabinets so that the things you use a lot are on the lower shelves (about waist level). · Keep a cordless phone and a flashlight with new batteries by your bed. If possible, put a phone in each of the main rooms of your house, or carry a cell phone in case you fall and cannot reach a phone. Or, you can wear a device around your neck or wrist. You push a button that sends a signal for help. · Wear low-heeled shoes that fit well and give your feet good support. Use footwear with nonskid soles. Check the heels and soles of your shoes for wear. Repair or replace worn heels or soles. · Do not wear socks without shoes on wood floors. · Walk on the grass when the sidewalks are slippery. If you live in an area that gets snow and ice in the winter, sprinkle salt on slippery steps and sidewalks. Preventing falls in the bath  · Install grab bars and nonskid mats inside and outside your shower or tub and near the toilet and sinks. · Use shower chairs and bath benches. · Use a hand-held shower head that will allow you to sit while showering. · Get into a tub or shower by putting the weaker leg in first. Get out of a tub or shower with your strong side first.  · Repair loose toilet seats and consider installing a raised toilet seat to make getting on and off the toilet easier. · Keep your bathroom door unlocked while you are in the shower. Where can you learn more? Go to http://carlos-abimbola.info/. Enter [phone] [id number] [phone] in the search box to learn more about \"Preventing Falls: Care Instructions. \"  Current as of: August 4, 2016  Content Version: 11.1  © 6570-5774 [employer]. Care instructions adapted under license by [employer] (which disclaims liability or warranty for this information).  If you have questions about a medical condition or this instruction, always ask your healthcare professional. [address] any warranty or liability for your use of this information.

## 2017-02-11 NOTE — ACP (ADVANCE CARE PLANNING)
Advance Care Planning (ACP) Provider Conversation Snapshot    Date of ACP Conversation: 02/11/17  Persons included in Conversation:  patient and POA  Length of ACP Conversation in minutes:  <16 minutes (Non-Billable)    Authorized Decision Maker (if patient is incapable of making informed decisions):    This person is:   Healthcare Agent/Medical Power of  under Advance Directive          For Patients with Decision Making Capacity:   Values/Goals: Exploration of values, goals, and preferences if recovery is not expected, even with continued medical treatment in the event of:  Imminent death    Conversation Outcomes / Follow-Up Plan:   Recommended communicating the plan and making copies for the healthcare agent, personal physician, and others as appropriate (e.g., health system)

## 2017-02-11 NOTE — PROGRESS NOTES
This is a Subsequent Medicare Annual Wellness Visit providing Personalized Prevention Plan Services (PPPS) (Performed 12 months after initial AWV and PPPS )    I have reviewed the patient's medical history in detail and updated the computerized patient record. History   Joselito Leyva is a 80 y.o. male. He is accompanied by his nephew, Brigida Burrows, who is his power of . Presents for follow up evaluation and Medicare Annual Wellness Visit. Last seen in clinic 4/19/16. He has HTN, CAD s/p cardiac stent x 2 in 2008, Parkinson's disease, hyperlipidemia, stroke, and BPH. His nephew reports that he has been having frequent falls over the past few months. Had a fall 4 days ago in which he landed on his right elbow. Since then has had swelling at right elbow. Falls even when he is using a walker. Had home PT for a few months but ended about 2 months ago due to changes in insurance. Since then having more falls, now occurring 4-5 times a day. He also has problems swallowing. Was evaluated months ago by a speech therapist who recommended thickened foods for him. His nephew is concerned about his declining health status and wonders if he needs to be in hospice.     Other Providers:  Dr. Nicole Ponce (Cardiology); no longer covered by insurance  Dr Festus Rodriguez (Neurology, [facility]); no longer covered by insurance  Dr. Gissel Ray (Endocrinology/Diabetes); no longer covered by insurance     Soc Hx  . He and his wife, who has dementia, live with their nephew, who has power of  other them both. Never smoker. Denies alcohol or recreational drug use. Gets some exercise by walking in house and driveway.     Health Maintenance  Flu vaccine: 11/2015                Pneumonia vaccine: 12/23/15                                          Tetanus vaccine:  Tdap 12/23/15                                     Zoster vaccine: 2013  Eye exam: over 2 yrs ago; [facility] (Dr. Rich Aguilar); due for this  Foot exam: 12/23/15  Lipids: 12/23/15  A1c: 12/23/15  Advanced Directives: has at home; instructed to bring in copy to clinic  End of Life: has at home; instructed to bring in copy to clinic                                        ROS  Constitutional: negative for fevers, chills, night sweats  ENT:   negative for sore throat, nasal congestion, ear pains,  Respiratory:  negative for cough, hemoptysis, dyspnea, wheezing  CV:   negative for chest pain, palpitations, lower extremity edema  GI:   negative for heartburn, abd pain, nausea, vomiting, diarrhea, constipation  Genitourinary: negative for frequency, dysuria and hematuria  Integument:  negative for rash and pruritus  Musculoskel: negative for myalgias, arthralgias, back pain, muscle weakness, joint pain  Neurological:  negative for headaches, dizziness; positive for gait problems, falls  Behavl/Psych: negative for feelings of anxiety, depression, mood change      Past Medical History   Diagnosis Date    CAD (coronary artery disease)      s/p stent; follows with Dr. Rodger Hwang Diabetes mellitus [facility])      type 2    Essential hypertension     Hyperlipidemia     Parkinson disease ([facility]     [facility]      follows with Dr. Rodger Hwang TIA (transient ischemic attack)       Past Surgical History   Procedure Laterality Date    Hx coronary stent placement      Hx back surgery  1997     Current Outpatient Prescriptions   Medication Sig Dispense Refill    furosemide (LASIX) 20 mg tablet Take 1 Tab by mouth daily. For foot and ankle swelling. 30 Tab 5    docusate sodium (COLACE) 100 mg capsule Take 1 capsule by mouth twice a day. Indications: Constipation 60 Cap 5    carbidopa-levodopa (SINEMET)  mg per tablet Take 2 Tabs by mouth three (3) times daily. Indications: IDIOPATHIC PARKINSONISM 180 Tab 5    losartan (COZAAR) 100 mg tablet Take 1 Tab by mouth daily. 30 Tab 5    metoprolol succinate (TOPROL-XL) 25 mg XL tablet Take 1 Tab by mouth daily.  27 Tab 5    pravastatin (PRAVACHOL) 40 mg tablet Take 1 Tab by mouth daily. 30 Tab 5    aspirin (ASPIRIN) 325 mg tablet Take 1 Tab by mouth daily. 90 Tab 3    methylPREDNISolone (MEDROL DOSEPACK) 4 mg tablet Use following package instructions. 1 Dose Pack 0    cephALEXin (KEFLEX) 250 mg capsule Take 1 Cap by mouth three (3) times daily for 7 days. 21 Cap 0    cranberry extract 450 mg tab Take  by mouth two (2) times a day.  PSYLLIUM SEED (METAFIBER PO) Take  by mouth two (2) times a day.  tamsulosin (FLOMAX) 0.4 mg capsule Take 1 Cap by mouth daily. For increased urination. Take with finasteride. 30 Cap 3    finasteride (PROSCAR) 5 mg tablet Take 5 mg by mouth daily. No Known Allergies  Family History   Problem Relation Age of Onset    Stroke Mother     No Known Problems Father     Parkinson's Disease Brother      Social History   Substance Use Topics    Smoking status: Never Smoker    Smokeless tobacco: Not on file    Alcohol use No     Patient Active Problem List   Diagnosis Code    Parkinson disease ([facility] G20    Essential hypertension I10    History of stroke Z80.78    CAD (coronary artery disease) I25.10    Advance care planning Z71.89    Hyperlipidemia E78.5    Sick sinus syndrome ([facility] I49.5    Type II diabetes mellitus ([facility] E11.9       Depression Risk Factor Screening:     PHQ 2 / 9, over the last two weeks 12/23/2015   Little interest or pleasure in doing things Not at all   Feeling down, depressed or hopeless Not at all   Total Score PHQ 2 0     Alcohol Risk Factor Screening: On any occasion during the past 3 months, have you had more than 4 drinks containing alcohol? No    Do you average more than 14 drinks per week? No      Functional Ability and Level of Safety:     Hearing Loss   mild-to-moderate    Activities of Daily Living   Partial assistance. Requires assistance with: ambulation    Fall Risk     Fall Risk Assessment, last 12 mths 2/10/2017   Able to walk?  Yes Fall in past 12 months? Yes   Fall with injury? Yes   Number of falls in past 12 months 8 or more   Fall Risk Score 9     Abuse Screen   Patient is not abused    Review of Systems   Pertinent items are noted in HPI. Physical Examination     Evaluation of Cognitive Function:  Mood/affect:  neutral  Appearance: age appropriate  Family member/caregiver input: nephew    Visit Vitals    /71    Pulse (!) 57    Temp 97.5 °F (36.4 °C) (Oral)    Resp 16    Ht 6' 1\" (1.854 m)    Wt 180 lb (81.6 kg)    SpO2 98%    BMI 23.75 kg/m2       General: Well-developed and well-nourished, no distress. HEENT:  Head normocephalic/atraumatic, no scleral icterus  Neck: Supple. No lymphadenopathy, thyromegaly, or carotis bruits. Lungs:  Clear to ausculation bilaterally. Good air movement. Heart:  Bradycardic, regular rhythm, normal S1 and S2, no murmur, gallop, or rub  Extremities: No clubbing, cyanosis. Trace edema at bilateral LE's. Right elbow with swelling at olecranon bursa, erythema, warmth, and mild tenderness. Neurological: Alert and oriented to person and place. Knows year, month, day of week, but does not know date. Does not know current president. Pill-rolling tremor at rest. Shuffling gait. Normal strength throughout. Psychiatric: Normal mood and affect.  Behavior is normal.   Diabetic foot exam:     Left: Reflexes 2+     Vibratory sensation normal    Proprioception normal   Sharp/dull discrimination normal    Filament test reduced sensation with micro filament   Pulse DP: 2+ (normal)   Pulse PT: 2+ (normal)   Deformities: None  Right: Reflexes 2+   Vibratory sensation normal   Proprioception normal   Sharp/dull discrimination normal   Filament test reduced sensation with micro filament   Pulse DP: 2+ (normal)   Pulse PT: 2+ (normal)   Deformities: None    Patient Care Team:  Ace Dixon MD as PCP - General (Internal Medicine)  Isidro Townsend MD (Cardiology)        Advice/Referrals/Counseling   Education and counseling provided:  Are appropriate based on today's review and evaluation  End-of-Life planning (with patient's consent)  Pneumococcal Vaccine  Cardiovascular screening blood test  Screening for glaucoma  Diabetes screening test    Assessment/Plan       ICD-10-CM ICD-9-CM    1. Medicare annual wellness visit, subsequent Z00.00 V70.0    2. Recurrent falls R29.6 V15.88 AMB SUPPLY ORDER   3. Olecranon bursitis of right elbow M70.21 726.33 methylPREDNISolone (MEDROL DOSEPACK) 4 mg tablet      cephALEXin (KEFLEX) 250 mg capsule   4. Early onset Alzheimer's dementia without behavioral disturbance G30.0 331.0     F02.80 294.10    5. Parkinson disease ([facility] G20 332.0    6. Type 2 diabetes mellitus without complication, with long-term current use of insulin (HCC) E11.9 250.00 REFERRAL TO OPHTHALMOLOGY    Z79.4 V58.67 HEMOGLOBIN A1C WITH EAG      TSH AND FREE T4      MICROALBUMIN, UR, RAND W/ MICROALBUMIN/CREA RATIO   7. Essential hypertension X53 666.1 METABOLIC PANEL, COMPREHENSIVE      CBC WITH AUTOMATED DIFF   8. Coronary artery disease involving native coronary artery of native heart without angina pectoris I25.10 414.01    9. Mixed hyperlipidemia E78.2 272.2 LIPID PANEL   10. History of stroke Z86.73 V12.54    11. Advance care planning Z71.89 V65.49    12. Encounter for immunization Z23 V03.89 PNEUMOCOCCAL CONJ VACCINE 13 VALENT IM      ADMIN INFLUENZA VIRUS VAC       Ramone Moreno was seen today for elbow injury, fall, annual wellness visit and immunization/injection. Diagnoses and all orders for this visit:    Medicare annual wellness visit, subsequent    Recurrent falls  I explained to his nephew that falls and mild dementia are not sufficient reasons to place him in hospice.  I will put in a referral order for home PT instead (I am unsure what PT service is approved by [facility] but will investigate this.)   -     AMB SUPPLY ORDER [facility] bed with rails)    Olecranon bursitis of right elbow  - methylPREDNISolone (MEDROL DOSEPACK) 4 mg tablet; Use following package instructions. -     cephALEXin (KEFLEX) 250 mg capsule; Take 1 Cap by mouth three (3) times daily for 7 days. Early onset Alzheimer's dementia without behavioral disturbance    Parkinson disease ([facility]    Type 2 diabetes mellitus without complication, with long-term current use of insulin ([facility])  -     REFERRAL TO OPHTHALMOLOGY  -     HEMOGLOBIN A1C WITH EAG  -     TSH AND FREE T4  -     MICROALBUMIN, UR, RAND W/ MICROALBUMIN/CREA RATIO    Essential hypertension  -     METABOLIC PANEL, COMPREHENSIVE  -     CBC WITH AUTOMATED DIFF    Coronary artery disease involving native coronary artery of native heart without angina pectoris    Mixed hyperlipidemia  -     LIPID PANEL    History of stroke    Advance care planning    Encounter for immunization  -     Pneumococcal Conjugate vaccine - 13 valent (50 years and older)  -     Administration fee () for Medicare insured patients    Other orders  Refills on the following medications:  -     furosemide (LASIX) 20 mg tablet; Take 1 Tab by mouth daily. For foot and ankle swelling.  -     docusate sodium (COLACE) 100 mg capsule; Take 1 capsule by mouth twice a day. Indications: Constipation  -     carbidopa-levodopa (SINEMET)  mg per tablet; Take 2 Tabs by mouth three (3) times daily. Indications: IDIOPATHIC PARKINSONISM  -     losartan (COZAAR) 100 mg tablet; Take 1 Tab by mouth daily. -     metoprolol succinate (TOPROL-XL) 25 mg XL tablet; Take 1 Tab by mouth daily. -     pravastatin (PRAVACHOL) 40 mg tablet; Take 1 Tab by mouth daily. -     aspirin (ASPIRIN) 325 mg tablet; Take 1 Tab by mouth daily. -     CVD REPORT  -     DIABETES PATIENT EDUCATION  -     CKD REPORT    Greater than 40 mins direct face-to-face time spent with patient. Greater than 50% of time spent on counseling and coordination of care.     Follow-up Disposition:  Return in about 3 months (around 5/10/2017), or if symptoms worsen or fail to improve, for 3 month follow up; needs 40-60 mins. .. Patient seen and had Medicare Annual Wellness Exam; Wellness Schedule printed, reviewed, and given to patient.

## 2017-02-13 NOTE — ACP (ADVANCE CARE PLANNING)
Advance Care Planning (ACP) Provider Conversation Snapshot    Date of ACP Conversation: 02/10/17  Persons included in Conversation:  patient and POA  Length of ACP Conversation in minutes:  <16 minutes (Non-Billable)    Authorized Decision Maker (if patient is incapable of making informed decisions):    This person is:   Healthcare Agent/Medical Power of  under Advance Directive          For Patients with Decision Making Capacity:   Values/Goals: Exploration of values, goals, and preferences if recovery is not expected, even with continued medical treatment in the event of:  Imminent death    Conversation Outcomes / Follow-Up Plan:   Recommended communicating the plan and making copies for the healthcare agent, personal physician, and others as appropriate (e.g., health system)

## 2017-02-15 NOTE — PROGRESS NOTES
Inform patient's nephew, Allen De La Garza, that patient's labs showed normal kidney and liver tests, blood counts, thyroid, cholesterol, and urine protein. His diet-controlled diabetes was excellent with A1c of 5.7%. Normal is less than 7%. His sodium level was a little high, a sign of dehydration, so make sure he drinks plenty of water. Continue on all the same medications.

## 2017-02-22 NOTE — TELEPHONE ENCOUNTER
Spoke to patients anand Burrows. Order for PT/OT/speech therapy faxed to [address] [place] [address] at [phone]. Demographics, latest OV note and order for hospital bed faxed.

## 2017-02-24 NOTE — TELEPHONE ENCOUNTER
Kelsi from [employer] called asking for new order for PT,OT and speech therapy. Fax [phone]. Per Dr. Luda Milan verbal order read back orders placed for PO,OT and speech therapy.

## 2017-02-27 NOTE — TELEPHONE ENCOUNTER
Received phone call from patients anand Padilla. Jacy Padilla states [facility] [phone] is waiting for an insurance referral for a hospital bed for the patient. Jacy Padilla states he just spoke to someone from [facility] and they said they had called here today about the same thing. Unders[facility] has not received a phone call regarding a hospital bed for this patient. [facility] contacted and undersigned was told they are [facility] not DME providers.

## 2017-02-27 NOTE — TELEPHONE ENCOUNTER
Spoke to patients arlyn Quarles Dural that we will get referral from [facility] for hospital bed order.

## 2017-02-28 NOTE — TELEPHONE ENCOUNTER
After speaking with Eugenia Yang faxed order for hospital bed,office visit note and demographic information to Natanael Edwards at [phone].

## 2017-03-02 NOTE — TELEPHONE ENCOUNTER
Spoke to [facility] with [address] [phone]. Per Diogenes Angeles has received the order for the hospital bed and the order is being processed which may take 7-10 days. Amy Lane will get the approval from [facility] for the hospital bed.

## 2017-03-02 NOTE — TELEPHONE ENCOUNTER
Spoke to Nati Hernandez and explained information to him regarding hospital bed. Nati Hernandez stated understanding.

## 2017-03-02 NOTE — TELEPHONE ENCOUNTER
Pt's nephew Teena Marc called and stated that he just spoke with [facility] regarding the hospital bed. Human asked him to call and speak with Evy regarding this. Teena Marc would like a call back at [phone].

## 2017-03-02 NOTE — TELEPHONE ENCOUNTER
Ephraim Lake with [facility] stating she needs new authorization with [facility] for PT,OT and speech therapy with a start date of 3/6/2017. Phone [phone].  Fax [phone]

## 2017-03-08 NOTE — TELEPHONE ENCOUNTER
Spoke with Edilberto Crockett with [facility]. Per Dr. Una Cote verbal order given for continued PT twice a week for 8 weeks. [phone].

## 2017-03-24 NOTE — TELEPHONE ENCOUNTER
Spoke to [facility] PT. Per Ramos Chavira patient has declined a lot over the last two weeks,not eating as weell and having increased falls. The family has asked [facility] about hospice. Informed Ramos Chavira that patient will need to be seen for evaluation in office. A phone call will be placed to Priyanka Oneill (nephew).

## 2017-04-04 NOTE — PROGRESS NOTES
Reviewed record  In preparation for visit and have obtained necessary documentation. 1. Have you been to the ER, urgent care clinic since your last visit? Hospitalized since your last visit?no  2. Have you seen or consulted any other health care providers outside of the [address] [place] [address] since your last visit? Include any pap smears or colon screening. Seeing [facility] services  Patient declines information on advanced directives.

## 2017-04-04 NOTE — PATIENT INSTRUCTIONS
Parkinson's Disease: Care Instructions  Your Care Instructions  Parkinson's disease can cause tremors, stiffness, and problems with movement. Severe or advanced cases can also cause problems with thinking. In Parkinson's disease, part of the brain cannot make enough dopamine, a chemical that helps control movement. Taking your medicines correctly and getting regular exercise may help you maintain your quality of life. There are many things that can cause Parkinson's disease symptoms, including some medicine, some toxins, and trauma to the head. The cause in most cases is not known. Follow-up care is a key part of your treatment and safety. Be sure to make and go to all appointments, and call your doctor if you are having problems. Its also a good idea to know your test results and keep a list of the medicines you take. How can you care for yourself at home? General care  · Take your medicines exactly as prescribed. Call your doctor if you think you are having a problem with your medicine. · Make sure your home is safe:  ¨ Place furniture so that you have something to hold on to as you walk around the house. ¨ Use chairs that make it easier to sit down and stand up. ¨ Group the things you use most, such as reading glasses, keys, and the telephone, in one easy-to-reach place. ¨ Tack down rugs so that you do not trip. ¨ Put no-slip tape and handrails in the tub to prevent falls. · Use a cane, walker, or scooter if your doctor suggests it. · Keep up your normal activities as much as you can. · Find ways to manage stress, which can make symptoms worse. · Spend time with family and friends. Join a support group for people with Parkinson's disease if you want extra help. · Depression is common with this condition. Tell your doctor if you have trouble sleeping, are eating too much or are not hungry, or feel sad or tearful all the time. Depression can be treated with medicine and counseling.   Diet and exercise  · Eat a balanced diet. · If you are taking levodopa, do not eat protein at the same time you take your medicine. Levodopa may not work as well if you take it at the same time you eat protein. You can eat normal amounts of protein. Talk to your doctor if you have questions. · If you have problems swallowing, change how and what you eat:  ¨ Try thick drinks, such as milk shakes. They are easier to swallow than other fluids. ¨ Do not eat foods that crumble easily. These can cause choking. ¨ Use a  to prepare food. Soft foods need less chewing. ¨ Eat small meals often so that you do not get tired from eating heavy meals. · Drink plenty of water and eat a high-fiber diet to prevent constipation. Parkinson'sand the medicines that treat itmay slow your intestines. · Get exercise on most days. Work with your doctor to set up a program of walking, swimming, or other exercise you are able to do. When should you call for help? Call your doctor now or seek immediate medical care if:  · You have a change in your symptoms. · You develop other problems from your condition, such as:  ¨ Injury from a fall. ¨ Thinking or memory problems. ¨ A urinary tract infection (burning pain when urinating). Watch closely for changes in your health, and be sure to contact your doctor if:  · You lose weight because of problems with eating. · You want more information about your condition or your medicines. Where can you learn more? Go to http://carlos-abimbola.info/. Enter R308 in the search box to learn more about \"Parkinson's Disease: Care Instructions. \"  Current as of: October 14, 2016  Content Version: 11.2  © 5481-3396 [employer]. Care instructions adapted under license by [employer] (which disclaims liability or warranty for this information).  If you have questions about a medical condition or this instruction, always ask your healthcare professional. [employer] disclaims any warranty or liability for your use of this information.

## 2017-04-04 NOTE — PROGRESS NOTES
CC:  Chief Complaint   Patient presents with   Codi Yanez Other     Hospice consult     HISTORY OF PRESENT ILLNESS  Fabiana Silva is a 80 y.o. male. He is accompanied by his nephew, Ariana Brasher, who is his power of . Presents for hospice eligibility evaluation. He has HTN, CAD s/p cardiac stent x 2 in 2008, Parkinson's disease, hyperlipidemia, stroke, and BPH. His son reports that his health has gradually declining over the past 2 months. He has been having falls 5-10 times a day. Falls even when he is using a walker. I referred him to [address] [place] [address] for gait strengthening and balance training. His physical therapist commented that he noticed a decline in his functional status during the 2 weeks he worked with him. Patient has also had cognitive decline. Some days he cannot recall his nephew or other family members names. He has started wandering around the house at nighttime and appearing confused at times. Over the past 2 months, he has been spending more than half his days in a chair or in bed. Does not walk with walker like he used to. His Sinemet does not appear to be having much benefit on tremors and gait. His nephew has had increased stress caring for him. Soc Hx  . He and his wife, who has dementia, live with their nephew, who is the power of  for them both. Never smoker. Denies alcohol or recreational drug use. No longer gets regular exercise (used to get some exercise by walking in house and driveway).      Health Maintenance  Flu vaccine: 11/2015                Pneumonia vaccine: 12/23/15                                          Tetanus vaccine:  Tdap 12/23/15                                     Zoster vaccine: 2013  Eye exam: over 2 yrs ago; [facility] (Dr. Santi Falk); due for this  Foot exam: 12/23/15  Lipids: 12/23/15  A1c: 12/23/15  Advanced Directives: has at home; instructed to bring in copy to clinic  End of Life: has at home; instructed to bring in copy to clinic                    ROS  Constitutional: negative for fevers, chills, night sweats  ENT:   negative for sore throat, nasal congestion, ear pains  Respiratory:  negative for cough, dyspnea, wheezing  CV:   negative for chest pain, palpitations, lower extremity edema  GI:   negative for heartburn, abd pain, nausea, vomiting, diarrhea, constipation  Genitourinary: negative for frequency, dysuria and hematuria  Integument:  negative for rash and pruritus  Musculoskel: negative for myalgias, back pain, muscle weakness; positive for bilateral knee and right elbow pain  Neurological:  negative for headaches, dizziness, vertigo; positive for gait problems, falls  Behavl/Psych: negative for feelings of anxiety, depression, mood change     Patient Active Problem List   Diagnosis Code    Parkinson disease ([facility] G20    Essential hypertension I10    History of stroke Z80.78    CAD (coronary artery disease) I25.10    Advance care planning Z71.89    Hyperlipidemia E78.5    Sick sinus syndrome ([facility] I49.5    Type II diabetes mellitus ([facility] E11.9     Past Medical History:   Diagnosis Date    CAD (coronary artery disease)     s/p stent; follows with Dr. Jesica Saleh Diabetes mellitus [facility])     type 2    Essential hypertension     Hyperlipidemia     Parkinson disease ([facility]     Sick sinus syndrome ([facility]     follows with Dr. Jesica Saleh TIA (transient ischemic attack)      No Known Allergies  Current Outpatient Prescriptions   Medication Sig Dispense Refill    polyethylene glycol (MIRALAX) 17 gram packet Take 17 g by mouth daily.  furosemide (LASIX) 20 mg tablet Take 1 Tab by mouth daily. For foot and ankle swelling. 30 Tab 5    docusate sodium (COLACE) 100 mg capsule Take 1 capsule by mouth twice a day. Indications: Constipation 60 Cap 5    carbidopa-levodopa (SINEMET)  mg per tablet Take 2 Tabs by mouth three (3) times daily.  Indications: IDIOPATHIC PARKINSONISM 180 Tab 5    losartan (COZAAR) 100 mg tablet Take 1 Tab by mouth daily. 30 Tab 5    metoprolol succinate (TOPROL-XL) 25 mg XL tablet Take 1 Tab by mouth daily. 30 Tab 5    pravastatin (PRAVACHOL) 40 mg tablet Take 1 Tab by mouth daily. 30 Tab 5    aspirin (ASPIRIN) 325 mg tablet Take 1 Tab by mouth daily. 90 Tab 3    cranberry extract 450 mg tab Take  by mouth two (2) times a day.  PSYLLIUM SEED (METAFIBER PO) Take  by mouth two (2) times a day.  tamsulosin (FLOMAX) 0.4 mg capsule Take 1 Cap by mouth daily. For increased urination. Take with finasteride. 30 Cap 3    finasteride (PROSCAR) 5 mg tablet Take 5 mg by mouth daily. PHYSICAL EXAM  Visit Vitals    /71    Pulse 71    Temp 98.4 °F (36.9 °C) (Oral)    Resp 18    Ht 6' 1\" (1.854 m)    Wt 173 lb (78.5 kg)    SpO2 96%    BMI 22.82 kg/m2       General: Well-developed and well-nourished, no distress. HEENT: Head normocephalic/atraumatic, no scleral icterus  Neck: Supple. No lymphadenopathy, thyromegaly, or carotis bruits. Lungs:  Clear to ausculation bilaterally. Good air movement. Heart: Regular rate and rhythm, normal S1 and S2, no murmur, gallop, or rub  Extremities: No clubbing, cyanosis, or edema. Right elbow with mild swelling, warmth, and tenderness at olecranon bursa. Skin: Numerous excoriations and scrapes of varying stages at both legs and arms. Neurological: Alert and oriented to person. Not oriented to place or time (knows year and day of week but does not know month or date). Does not know current president. Pill-rolling tremor at rest. Shuffling gait. Normal strength throughout. Psychiatric: Normal mood and affect.  Behavior is normal.     Results for orders placed or performed in visit on 76/15/99   METABOLIC PANEL, COMPREHENSIVE   Result Value Ref Range    Glucose 89 65 - 99 mg/dL    BUN 25 8 - 27 mg/dL    Creatinine 1.18 0.76 - 1.27 mg/dL    GFR est non-AA 58 (L) >59 mL/min/1.73    GFR est AA 67 >59 mL/min/1.73    BUN/Creatinine ratio 21 10 - 22    Sodium 147 (H) 134 - 144 mmol/L    Potassium 5.4 (H) 3.5 - 5.2 mmol/L    Chloride 104 96 - 106 mmol/L    CO2 27 18 - 29 mmol/L    Calcium 9.2 8.6 - 10.2 mg/dL    Protein, total 6.5 6.0 - 8.5 g/dL    Albumin 4.0 3.5 - 4.7 g/dL    GLOBULIN, TOTAL 2.5 1.5 - 4.5 g/dL    A-G Ratio 1.6 1.1 - 2.5    Bilirubin, total 0.3 0.0 - 1.2 mg/dL    Alk. phosphatase 66 39 - 117 IU/L    AST (SGOT) 22 0 - 40 IU/L    ALT (SGPT) 9 0 - 44 IU/L   CBC WITH AUTOMATED DIFF   Result Value Ref Range    WBC 10.4 3.4 - 10.8 x10E3/uL    RBC 4.03 (L) 4.14 - 5.80 x10E6/uL    HGB 12.8 12.6 - 17.7 g/dL    HCT 37.8 37.5 - 51.0 %    MCV 94 79 - 97 fL    MCH 31.8 26.6 - 33.0 pg    MCHC 33.9 31.5 - 35.7 g/dL    RDW 12.9 12.3 - 15.4 %    PLATELET 673 773 - 952 x10E3/uL    NEUTROPHILS 79 %    Lymphocytes 11 %    MONOCYTES 8 %    EOSINOPHILS 2 %    BASOPHILS 0 %    ABS. NEUTROPHILS 8.3 (H) 1.4 - 7.0 x10E3/uL    Abs Lymphocytes 1.1 0.7 - 3.1 x10E3/uL    ABS. MONOCYTES 0.8 0.1 - 0.9 x10E3/uL    ABS. EOSINOPHILS 0.2 0.0 - 0.4 x10E3/uL    ABS. BASOPHILS 0.0 0.0 - 0.2 x10E3/uL    IMMATURE GRANULOCYTES 0 %    ABS. IMM.  GRANS. 0.0 0.0 - 0.1 x10E3/uL   HEMOGLOBIN A1C WITH EAG   Result Value Ref Range    Hemoglobin A1c 5.7 (H) 4.8 - 5.6 %    Estimated average glucose 117 mg/dL   TSH AND FREE T4   Result Value Ref Range    TSH 1.110 0.450 - 4.500 uIU/mL    T4, Free 0.98 0.82 - 1.77 ng/dL   LIPID PANEL   Result Value Ref Range    Cholesterol, total 131 100 - 199 mg/dL    Triglyceride 99 0 - 149 mg/dL    HDL Cholesterol 46 >39 mg/dL    VLDL, calculated 20 5 - 40 mg/dL    LDL, calculated 65 0 - 99 mg/dL   MICROALBUMIN, UR, RAND W/ MICROALBUMIN/CREA RATIO   Result Value Ref Range    Creatinine, urine 64.7 Not Estab. mg/dL    Microalbumin, urine 10.1 Not Estab. ug/mL    Microalb/Creat ratio (ug/mg creat.) 15.6 0.0 - 30.0 mg/g creat   CVD REPORT   Result Value Ref Range    INTERPRETATION NTAP     PDF IMAGE Not applicable    DIABETES PATIENT EDUCATION   Result Value Ref Range    PDF Image Not applicable    CKD REPORT   Result Value Ref Range    Interpretation Note          ASSESSMENT AND PLAN    ICD-10-CM ICD-9-CM    1. Declining functional status R53.81 799.3 REFERRAL TO HOSPICE   2. Cognitive decline R41.89 294.9    3. Parkinson disease ([facility] G20 332.0    4. Type 2 diabetes mellitus without complication, with long-term current use of insulin (HCC) E11.9 250.00     Z79.4 V58.67    5. Essential hypertension I10 401.9    6. Coronary artery disease involving native coronary artery of native heart without angina pectoris I25.10 414.01    7. History of stroke Z86.73 V12.54    8. Mixed hyperlipidemia E78.2 272.2       I am concerned about his declining functional status and cognitive decline. There is an obvious decline in status compared to his evaluation in clinic a month ago. I believe he is a candidate for hospice due to his decreased functional status, physical distress, poor quality of life, increased frequency of falls, and increased caregiver burden and stress. Because of his increased age and worsening dementia, he has increased mortality and is likely to live no longer than 6 months. A referral has been placed to [facility] for hospice eligibility evaluation. Follow-up Disposition:  Return if symptoms worsen or fail to improve. Provided patient and/or family with advanced directive information and answered pertinent questions. Encouraged patient to provide a copy of advanced directive to the office when available. I have discussed the diagnosis with the patient and the intended plan as seen in the above orders. Patient is in agreement. The patient has received an after-visit summary and questions were answered concerning future plans. I have discussed medication side effects and warnings with the patient as well.

## 2017-04-04 NOTE — MR AVS SNAPSHOT
Visit Information Date & Time Provider Department Dept. Phone Encounter #  
 4/4/2017  3:00 PM Sharda Fermin, [facility] [phone] [id number] Follow-up Instructions Return if symptoms worsen or fail to improve. Your Appointments 5/15/2017  1:40 PM  
ROUTINE CARE with Sharda Fermin MD  
[facility] [address] [facility]) Appt Note: 3 month fu / $0Cp Kg 02.10.17  
 [address] [facility] [address] [phone]  
  
   
 [address] Upcoming Health Maintenance Date Due  
 EYE EXAM RETINAL OR DILATED Q1 7/27/1945 GLAUCOMA SCREENING Q2Y 7/27/2000 FOOT EXAM Q1 12/23/2016 HEMOGLOBIN A1C Q6M 8/10/2017 MICROALBUMIN Q1 2/10/2018 LIPID PANEL Q1 2/10/2018 MEDICARE YEARLY EXAM 2/11/2018 DTaP/Tdap/Td series (2 - Td) 12/23/2025 Allergies as of 4/4/2017  Review Complete On: 4/4/2017 By: Sharda Fermin MD  
 No Known Allergies Current Immunizations  Reviewed on 12/23/2015 Name Date Pneumococcal Conjugate (PCV-13) 2/13/2017 Pneumococcal Polysaccharide (PPSV-23) 12/23/2015 Tdap 12/23/2015 Not reviewed this visit You Were Diagnosed With   
  
 Codes Comments Declining functional status    -  Primary ICD-10-CM: R53.81 ICD-9-CM: 799.3 Cognitive decline     ICD-10-CM: R41.89 ICD-9-CM: 294.9 Parkinson disease ([facility]     ICD-10-CM: G20 
ICD-9-CM: 332.0 Essential hypertension     ICD-10-CM: I10 
ICD-9-CM: 401.9 Coronary artery disease involving native coronary artery of native heart without angina pectoris     ICD-10-CM: I25.10 ICD-9-CM: 414.01 History of stroke     ICD-10-CM: Z86.73 
ICD-9-CM: V12.54 Type 2 diabetes mellitus without complication, with long-term current use of insulin (HCC)     ICD-10-CM: E11.9, Z79.4 ICD-9-CM: 250.00, V58.67 Vitals BP Pulse Temp Resp Height(growth percentile) Weight(growth percentile) 143/71 71 98.4 °F (36.9 °C) (Oral) 18 6' 1\" (1.854 m) 173 lb (78.5 kg) SpO2 BMI Smoking Status 96% 22.82 kg/m2 Never Smoker Vitals History BMI and BSA Data Body Mass Index Body Surface Area  
 22.82 kg/m 2 2.01 m 2 Preferred Pharmacy Pharmacy Name Phone [facility] [place], [place] - [address] [place] [address] Eloisa Hoffman [phone] Your Updated Medication List  
  
   
This list is accurate as of: 4/4/17  4:01 PM.  Always use your most recent med list.  
  
  
  
  
 aspirin 325 mg tablet Commonly known as:  ASPIRIN Take 1 Tab by mouth daily. carbidopa-levodopa  mg per tablet Commonly known as:  SINEMET Take 2 Tabs by mouth three (3) times daily. Indications: IDIOPATHIC PARKINSONISM  
  
 cranberry extract 450 mg Tab Take  by mouth two (2) times a day. docusate sodium 100 mg capsule Commonly known as:  [place] Given Take 1 capsule by mouth twice a day. Indications: Constipation  
  
 finasteride 5 mg tablet Commonly known as:  PROSCAR Take 5 mg by mouth daily. furosemide 20 mg tablet Commonly known as:  LASIX Take 1 Tab by mouth daily. For foot and ankle swelling. losartan 100 mg tablet Commonly known as:  COZAAR Take 1 Tab by mouth daily. METAFIBER PO Take  by mouth two (2) times a day. metoprolol succinate 25 mg XL tablet Commonly known as:  TOPROL-XL Take 1 Tab by mouth daily. MIRALAX 17 gram packet Generic drug:  polyethylene glycol Take 17 g by mouth daily. pravastatin 40 mg tablet Commonly known as:  PRAVACHOL Take 1 Tab by mouth daily. tamsulosin 0.4 mg capsule Commonly known as:  FLOMAX Take 1 Cap by mouth daily. For increased urination. Take with finasteride. We Performed the Following REFERRAL TO HOSPICE [REF35 Custom] Comments:  
 Referral to: Navdeep Felix [id number] Follow-up Instructions Return if symptoms worsen or fail to improve. Referral Information Referral ID Referred By Referred To  
  
 [id number] Vidal Jaimes Not Available Visits Status Start Date End Date 1 New Request 4/4/17 4/4/18 If your referral has a status of pending review or denied, additional information will be sent to support the outcome of this decision. Patient Instructions Parkinson's Disease: Care Instructions Your Care Instructions Parkinson's disease can cause tremors, stiffness, and problems with movement. Severe or advanced cases can also cause problems with thinking. In Parkinson's disease, part of the brain cannot make enough dopamine, a chemical that helps control movement. Taking your medicines correctly and getting regular exercise may help you maintain your quality of life. There are many things that can cause Parkinson's disease symptoms, including some medicine, some toxins, and trauma to the head. The cause in most cases is not known. Follow-up care is a key part of your treatment and safety. Be sure to make and go to all appointments, and call your doctor if you are having problems. Its also a good idea to know your test results and keep a list of the medicines you take. How can you care for yourself at home? General care · Take your medicines exactly as prescribed. Call your doctor if you think you are having a problem with your medicine. · Make sure your home is safe: 
¨ Place furniture so that you have something to hold on to as you walk around the house. ¨ Use chairs that make it easier to sit down and stand up. ¨ Group the things you use most, such as reading glasses, keys, and the telephone, in one easy-to-reach place. ¨ Tack down rugs so that you do not trip. ¨ Put no-slip tape and handrails in the tub to prevent falls. · Use a cane, walker, or scooter if your doctor suggests it. · Keep up your normal activities as much as you can. · Find ways to manage stress, which can make symptoms worse. · Spend time with family and friends. Join a support group for people with Parkinson's disease if you want extra help. · Depression is common with this condition. Tell your doctor if you have trouble sleeping, are eating too much or are not hungry, or feel sad or tearful all the time. Depression can be treated with medicine and counseling. Diet and exercise · Eat a balanced diet. · If you are taking levodopa, do not eat protein at the same time you take your medicine. Levodopa may not work as well if you take it at the same time you eat protein. You can eat normal amounts of protein. Talk to your doctor if you have questions. · If you have problems swallowing, change how and what you eat: ¨ Try thick drinks, such as milk shakes. They are easier to swallow than other fluids. ¨ Do not eat foods that crumble easily. These can cause choking. ¨ Use a  to prepare food. Soft foods need less chewing. ¨ Eat small meals often so that you do not get tired from eating heavy meals. · Drink plenty of water and eat a high-fiber diet to prevent constipation. Parkinson'sand the medicines that treat itmay slow your intestines. · Get exercise on most days. Work with your doctor to set up a program of walking, swimming, or other exercise you are able to do. When should you call for help? Call your doctor now or seek immediate medical care if: 
· You have a change in your symptoms. · You develop other problems from your condition, such as: ¨ Injury from a fall. ¨ Thinking or memory problems. ¨ A urinary tract infection (burning pain when urinating). Watch closely for changes in your health, and be sure to contact your doctor if: 
· You lose weight because of problems with eating. · You want more information about your condition or your medicines. Where can you learn more? Go to http://carlos-abimbola.info/. Enter Y179 in the search box to learn more about \"Parkinson's Disease: Care Instructions. \" Current as of: October 14, 2016 Content Version: 11.2 © 3701-4817 [employer]. Care instructions adapted under license by [employer] (which disclaims liability or warranty for this information). If you have questions about a medical condition or this instruction, always ask your healthcare professional. [address] any warranty or liability for your use of this information. Introducing [facility]! Margaret Guthrie introduces NumberPicture patient portal. Now you can access parts of your medical record, email your doctor's office, and request medication refills online. 1. In your internet browser, go to [email or web address] 2. Click on the First Time User? Click Here link in the Sign In box. You will see the New Member Sign Up page. 3. Enter your NumberPicture Access Code exactly as it appears below. You will not need to use this code after youve completed the sign-up process. If you do not sign up before the expiration date, you must request a new code. · NumberPicture Access Code: [email or web address] Expires: 5/11/2017  2:58 PM 
 
4. Enter the last four digits of your Social Security Number (xxxx) and Date of Birth (mm/dd/yyyy) as indicated and click Submit. You will be taken to the next sign-up page. 5. Create a NumberPicture ID. This will be your NumberPicture login ID and cannot be changed, so think of one that is secure and easy to remember. 6. Create a NumberPicture password. You can change your password at any time. 7. Enter your Password Reset Question and Answer. This can be used at a later time if you forget your password. 8. Enter your e-mail address. You will receive e-mail notification when new information is available in [address] 9. Click Sign Up.  You can now view and download portions of your medical record. 10. Click the Download Summary menu link to download a portable copy of your medical information. If you have questions, please visit the Frequently Asked Questions section of the [employer] website. Remember, [employer]Gene is NOT to be used for urgent needs. For medical emergencies, dial 911. Now available from your iPhone and Android! Please provide this summary of care documentation to your next provider. Your primary care clinician is listed as Elizabet Gavin. If you have any questions after today's visit, please call [phone].

## 2017-04-05 NOTE — PROGRESS NOTES
Order for hospice,demographic sheet and OV note from 4/4/17 faxed to [facility].

## 2017-04-05 NOTE — TELEPHONE ENCOUNTER
[facility][phone] called to speak with nurse in regards to [facility] [place] [facility].

## 2017-04-05 NOTE — TELEPHONE ENCOUNTER
Returned call to Jay. Left message that patient was seen 4/4/17 and received referral for evaluation to hospice. Liliana Rutherford to return call if she has questions/concerns.

## 2017-04-21 NOTE — TELEPHONE ENCOUNTER
Kristan from [facility] stated that she faxed over some orders and would like a call back to see what the status is of them. Renato Arrington can be reached at [phone].